# Patient Record
Sex: FEMALE | Race: ASIAN | NOT HISPANIC OR LATINO | URBAN - METROPOLITAN AREA
[De-identification: names, ages, dates, MRNs, and addresses within clinical notes are randomized per-mention and may not be internally consistent; named-entity substitution may affect disease eponyms.]

---

## 2021-01-12 ENCOUNTER — IMMUNIZATION (OUTPATIENT)
Dept: LAB | Age: 61
End: 2021-01-12

## 2021-01-12 DIAGNOSIS — Z23 NEED FOR VACCINATION: Primary | ICD-10-CM

## 2021-01-12 PROCEDURE — 0011A COVID-19 MODERNA VACCINE: CPT

## 2021-01-12 PROCEDURE — 91301 COVID-19 MODERNA VACCINE: CPT

## 2021-02-09 ENCOUNTER — IMMUNIZATION (OUTPATIENT)
Dept: LAB | Age: 61
End: 2021-02-09

## 2021-02-09 DIAGNOSIS — Z23 NEED FOR VACCINATION: Primary | ICD-10-CM

## 2021-02-09 PROCEDURE — 91301 COVID-19 MODERNA VACCINE: CPT

## 2021-02-09 PROCEDURE — 0012A COVID-19 MODERNA VACCINE: CPT

## 2021-09-24 ENCOUNTER — HOSPITAL ENCOUNTER (OUTPATIENT)
Dept: MAMMOGRAPHY | Facility: HOSPITAL | Age: 61
Discharge: HOME | End: 2021-09-24
Attending: INTERNAL MEDICINE
Payer: COMMERCIAL

## 2021-09-24 DIAGNOSIS — Z12.31 ENCOUNTER FOR SCREENING MAMMOGRAM FOR MALIGNANT NEOPLASM OF BREAST: ICD-10-CM

## 2021-09-24 DIAGNOSIS — R92.2 DENSE BREAST: ICD-10-CM

## 2021-09-24 PROCEDURE — 77067 SCR MAMMO BI INCL CAD: CPT | Performed by: INTERNAL MEDICINE

## 2021-09-24 PROCEDURE — 77063 BREAST TOMOSYNTHESIS BI: CPT | Performed by: INTERNAL MEDICINE

## 2022-06-13 ENCOUNTER — OFFICE VISIT (OUTPATIENT)
Dept: INTERNAL MEDICINE CLINIC | Facility: CLINIC | Age: 62
End: 2022-06-13
Payer: COMMERCIAL

## 2022-06-13 VITALS
WEIGHT: 136.81 LBS | HEIGHT: 60.32 IN | SYSTOLIC BLOOD PRESSURE: 120 MMHG | BODY MASS INDEX: 26.51 KG/M2 | RESPIRATION RATE: 14 BRPM | HEART RATE: 65 BPM | OXYGEN SATURATION: 99 % | DIASTOLIC BLOOD PRESSURE: 78 MMHG | TEMPERATURE: 98 F

## 2022-06-13 DIAGNOSIS — Z86.39 HISTORY OF THYROID NODULE: ICD-10-CM

## 2022-06-13 DIAGNOSIS — M85.80 OSTEOPENIA, UNSPECIFIED LOCATION: ICD-10-CM

## 2022-06-13 DIAGNOSIS — G62.9 NEUROPATHY: ICD-10-CM

## 2022-06-13 DIAGNOSIS — H11.002 PTERYGIUM EYE, LEFT: ICD-10-CM

## 2022-06-13 DIAGNOSIS — Z00.00 PHYSICAL EXAM, ANNUAL: Primary | ICD-10-CM

## 2022-06-13 DIAGNOSIS — Z12.4 CERVICAL CANCER SCREENING: ICD-10-CM

## 2022-06-13 DIAGNOSIS — Z12.11 SCREENING FOR COLON CANCER: ICD-10-CM

## 2022-06-13 DIAGNOSIS — Z12.31 ENCOUNTER FOR SCREENING MAMMOGRAM FOR BREAST CANCER: ICD-10-CM

## 2022-06-13 DIAGNOSIS — Z78.0 POSTMENOPAUSAL: ICD-10-CM

## 2022-06-13 PROBLEM — E03.9 ACQUIRED HYPOTHYROIDISM: Status: ACTIVE | Noted: 2022-06-13

## 2022-06-13 PROBLEM — M25.562 CHRONIC PAIN OF LEFT KNEE: Status: ACTIVE | Noted: 2019-04-18

## 2022-06-13 PROBLEM — M17.12 PRIMARY OSTEOARTHRITIS OF LEFT KNEE: Status: ACTIVE | Noted: 2019-04-18

## 2022-06-13 PROBLEM — G89.29 CHRONIC PAIN OF LEFT KNEE: Status: ACTIVE | Noted: 2019-04-18

## 2022-06-13 RX ORDER — IBANDRONATE SODIUM 150 MG/1
1 TABLET, FILM COATED ORAL
COMMUNITY
Start: 2015-01-01 | End: 2022-06-13

## 2022-06-13 RX ORDER — LEVOTHYROXINE SODIUM 0.07 MG/1
1 TABLET ORAL
COMMUNITY
Start: 2005-01-01

## 2022-06-13 RX ORDER — CHOLECALCIFEROL (VITAMIN D3) 25 MCG
TABLET,CHEWABLE ORAL
COMMUNITY
Start: 2016-01-01

## 2022-06-20 ENCOUNTER — TELEPHONE (OUTPATIENT)
Dept: INTERNAL MEDICINE CLINIC | Facility: CLINIC | Age: 62
End: 2022-06-20

## 2022-06-20 DIAGNOSIS — Z00.00 ANNUAL PHYSICAL EXAM: Primary | ICD-10-CM

## 2022-06-20 LAB
ALBUMIN/GLOBULIN RATIO: 1.5 (CALC) (ref 1–2.5)
ALBUMIN: 4.1 G/DL (ref 3.6–5.1)
ALKALINE PHOSPHATASE: 54 U/L (ref 37–153)
ALT: 16 U/L (ref 6–29)
AST: 19 U/L (ref 10–35)
BILIRUBIN, TOTAL: 0.6 MG/DL (ref 0.2–1.2)
BUN: 18 MG/DL (ref 7–25)
CALCIUM: 9.5 MG/DL (ref 8.6–10.4)
CARBON DIOXIDE: 28 MMOL/L (ref 20–32)
CHLORIDE: 105 MMOL/L (ref 98–110)
CHOL/HDLC RATIO: 3.3 (CALC)
CHOLESTEROL, TOTAL: 211 MG/DL
CREATININE: 0.85 MG/DL (ref 0.5–0.99)
EGFR IF AFRICN AM: 85 ML/MIN/1.73M2
EGFR IF NONAFRICN AM: 73 ML/MIN/1.73M2
GLOBULIN: 2.7 G/DL (CALC) (ref 1.9–3.7)
GLUCOSE: 90 MG/DL (ref 65–99)
HDL CHOLESTEROL: 64 MG/DL
LDL-CHOLESTEROL: 125 MG/DL (CALC)
NON-HDL CHOLESTEROL: 147 MG/DL (CALC)
POTASSIUM: 4.1 MMOL/L (ref 3.5–5.3)
PROTEIN, TOTAL: 6.8 G/DL (ref 6.1–8.1)
SODIUM: 140 MMOL/L (ref 135–146)
TRIGLYCERIDES: 116 MG/DL

## 2022-06-21 LAB
ABSOLUTE BASOPHILS: 62 CELLS/UL (ref 0–200)
ABSOLUTE EOSINOPHILS: 360 CELLS/UL (ref 15–500)
ABSOLUTE LYMPHOCYTES: 2350 CELLS/UL (ref 850–3900)
ABSOLUTE MONOCYTES: 539 CELLS/UL (ref 200–950)
ABSOLUTE NEUTROPHILS: 2889 CELLS/UL (ref 1500–7800)
ALBUMIN/GLOBULIN RATIO: 1.5 (CALC) (ref 1–2.5)
ALBUMIN: 4.1 G/DL (ref 3.6–5.1)
ALKALINE PHOSPHATASE: 54 U/L (ref 37–153)
ALT: 16 U/L (ref 6–29)
AST: 19 U/L (ref 10–35)
BASOPHILS: 1 %
BILIRUBIN, TOTAL: 0.6 MG/DL (ref 0.2–1.2)
BUN: 18 MG/DL (ref 7–25)
CALCIUM: 9.5 MG/DL (ref 8.6–10.4)
CARBON DIOXIDE: 28 MMOL/L (ref 20–32)
CHLORIDE: 105 MMOL/L (ref 98–110)
CHOL/HDLC RATIO: 3.3 (CALC)
CHOLESTEROL, TOTAL: 211 MG/DL
CREATININE: 0.85 MG/DL (ref 0.5–0.99)
EGFR IF AFRICN AM: 85 ML/MIN/1.73M2
EGFR IF NONAFRICN AM: 73 ML/MIN/1.73M2
EOSINOPHILS: 5.8 %
FOLATE, SERUM: 22.2 NG/ML
GLOBULIN: 2.7 G/DL (CALC) (ref 1.9–3.7)
GLUCOSE: 90 MG/DL (ref 65–99)
HDL CHOLESTEROL: 64 MG/DL
HEMATOCRIT: 41.8 % (ref 35–45)
HEMOGLOBIN A1C: 5.3 % OF TOTAL HGB
HEMOGLOBIN: 13.9 G/DL (ref 11.7–15.5)
LDL-CHOLESTEROL: 125 MG/DL (CALC)
LYMPHOCYTES: 37.9 %
MCH: 30.2 PG (ref 27–33)
MCHC: 33.3 G/DL (ref 32–36)
MCV: 90.7 FL (ref 80–100)
MONOCYTES: 8.7 %
MPV: 11 FL (ref 7.5–12.5)
NEUTROPHILS: 46.6 %
NON-HDL CHOLESTEROL: 147 MG/DL (CALC)
PLATELET COUNT: 275 THOUSAND/UL (ref 140–400)
POTASSIUM: 4.1 MMOL/L (ref 3.5–5.3)
PROTEIN, TOTAL: 6.8 G/DL (ref 6.1–8.1)
RDW: 12.5 % (ref 11–15)
RED BLOOD CELL COUNT: 4.61 MILLION/UL (ref 3.8–5.1)
SODIUM: 140 MMOL/L (ref 135–146)
TRIGLYCERIDES: 116 MG/DL
TSH W/REFLEX TO FT4: 1.17 MIU/L (ref 0.4–4.5)
VITAMIN B12: 1878 PG/ML (ref 200–1100)
VITAMIN D, 25-OH, TOTAL: 35 NG/ML (ref 30–100)
WHITE BLOOD CELL COUNT: 6.2 THOUSAND/UL (ref 3.8–10.8)

## 2022-06-23 LAB — HPV I/H RISK 1 DNA SPEC QL NAA+PROBE: NEGATIVE

## 2022-06-24 RX ORDER — CHOLECALCIFEROL (VITAMIN D3) 25 MCG
500 TABLET,CHEWABLE ORAL DAILY
Refills: 0 | COMMUNITY
Start: 2022-06-24

## 2022-08-01 ENCOUNTER — ORDER TRANSCRIPTION (OUTPATIENT)
Dept: ADMINISTRATIVE | Facility: HOSPITAL | Age: 62
End: 2022-08-01

## 2022-08-01 DIAGNOSIS — Z13.6 SCREENING FOR CARDIOVASCULAR CONDITION: Primary | ICD-10-CM

## 2022-08-26 ENCOUNTER — HOSPITAL ENCOUNTER (OUTPATIENT)
Dept: BONE DENSITY | Facility: HOSPITAL | Age: 62
Discharge: HOME | End: 2022-08-26
Attending: INTERNAL MEDICINE
Payer: COMMERCIAL

## 2022-08-26 DIAGNOSIS — Z78.0 POSTMENOPAUSAL: ICD-10-CM

## 2022-08-26 PROCEDURE — 77080 DXA BONE DENSITY AXIAL: CPT | Performed by: INTERNAL MEDICINE

## 2022-10-31 ENCOUNTER — HOSPITAL ENCOUNTER (OUTPATIENT)
Dept: MAMMOGRAPHY | Age: 62
Discharge: HOME | End: 2022-10-31
Attending: INTERNAL MEDICINE
Payer: COMMERCIAL

## 2022-10-31 DIAGNOSIS — Z12.31 ENCOUNTER FOR SCREENING MAMMOGRAM FOR BREAST CANCER: ICD-10-CM

## 2022-10-31 PROCEDURE — 77067 SCR MAMMO BI INCL CAD: CPT | Performed by: INTERNAL MEDICINE

## 2022-10-31 PROCEDURE — 77063 BREAST TOMOSYNTHESIS BI: CPT | Performed by: INTERNAL MEDICINE

## 2023-01-04 ENCOUNTER — MOBILE ENCOUNTER (OUTPATIENT)
Dept: INTERNAL MEDICINE CLINIC | Facility: CLINIC | Age: 63
End: 2023-01-04

## 2023-04-20 ENCOUNTER — TELEPHONE (OUTPATIENT)
Dept: INTERNAL MEDICINE CLINIC | Facility: CLINIC | Age: 63
End: 2023-04-20

## 2023-04-20 NOTE — TELEPHONE ENCOUNTER
Future Appointments   Date Time Provider Rhett Salazar   5/9/2023  8:00 AM Yomi Aldana MD [facility]     Pt made appt for abdominal pain.  Needed to triage the pt     Left message to call back

## 2023-04-20 NOTE — TELEPHONE ENCOUNTER
Talked to pt and has intermittent abdominal pain by the navel, bilateral  sides. Pt had it for more than 10 days. Pain is sometimes sharp and 3/10 on pain scale. Denies N/V, appetite  issues, constipation, fever. Pain is not radiating. Advised if symptoms worsen to go to [facility], otherwise to keep appt as is. Patient notified.  Patient verbalized understanding

## 2023-09-05 ENCOUNTER — TELEPHONE (OUTPATIENT)
Dept: INTERNAL MEDICINE CLINIC | Facility: CLINIC | Age: 63
End: 2023-09-05

## 2023-09-05 DIAGNOSIS — Z00.00 LABORATORY EXAMINATION ORDERED AS PART OF A ROUTINE GENERAL MEDICAL EXAMINATION: ICD-10-CM

## 2023-09-05 DIAGNOSIS — Z12.31 ENCOUNTER FOR SCREENING MAMMOGRAM FOR BREAST CANCER: Primary | ICD-10-CM

## 2023-09-05 NOTE — TELEPHONE ENCOUNTER
Hi Dr. Katherine Terrazas, patient has appointment 10/19/23 for annual wellness and asking for fasting lab orders and mammogram order to be placed. She is aware due for labs now, mammogram due after 10/31/23. Orders pended, you did iron levels last year as well but I just pended basics.

## 2023-09-06 NOTE — TELEPHONE ENCOUNTER
Noted     Future Appointments   Date Time Provider Rhett Salazar   10/19/2023  7:20 AM Violet Leon MD [facility]

## 2023-10-08 LAB
ABSOLUTE BASOPHILS: 53 CELLS/UL (ref 0–200)
ABSOLUTE EOSINOPHILS: 283 CELLS/UL (ref 15–500)
ABSOLUTE LYMPHOCYTES: 1658 CELLS/UL (ref 850–3900)
ABSOLUTE MONOCYTES: 543 CELLS/UL (ref 200–950)
ABSOLUTE NEUTROPHILS: 3363 CELLS/UL (ref 1500–7800)
ALBUMIN/GLOBULIN RATIO: 1.3 (CALC) (ref 1–2.5)
ALBUMIN: 4.3 G/DL (ref 3.6–5.1)
ALKALINE PHOSPHATASE: 76 U/L (ref 37–153)
ALT: 17 U/L (ref 6–29)
AST: 18 U/L (ref 10–35)
BASOPHILS: 0.9 %
BILIRUBIN, TOTAL: 0.6 MG/DL (ref 0.2–1.2)
BUN: 16 MG/DL (ref 7–25)
CALCIUM: 9.7 MG/DL (ref 8.6–10.4)
CARBON DIOXIDE: 27 MMOL/L (ref 20–32)
CHLORIDE: 105 MMOL/L (ref 98–110)
CHOL/HDLC RATIO: 3.8 (CALC)
CHOLESTEROL, TOTAL: 227 MG/DL
CREATININE: 0.76 MG/DL (ref 0.5–1.05)
EGFR: 87 ML/MIN/1.73M2
EOSINOPHILS: 4.8 %
GLOBULIN: 3.3 G/DL (CALC) (ref 1.9–3.7)
GLUCOSE: 105 MG/DL (ref 65–99)
HDL CHOLESTEROL: 60 MG/DL
HEMATOCRIT: 43.5 % (ref 35–45)
HEMOGLOBIN: 14.7 G/DL (ref 11.7–15.5)
LDL-CHOLESTEROL: 142 MG/DL (CALC)
LYMPHOCYTES: 28.1 %
MCH: 30.3 PG (ref 27–33)
MCHC: 33.8 G/DL (ref 32–36)
MCV: 89.7 FL (ref 80–100)
MONOCYTES: 9.2 %
MPV: 11 FL (ref 7.5–12.5)
NEUTROPHILS: 57 %
NON-HDL CHOLESTEROL: 167 MG/DL (CALC)
PLATELET COUNT: 330 THOUSAND/UL (ref 140–400)
POTASSIUM: 4.1 MMOL/L (ref 3.5–5.3)
PROTEIN, TOTAL: 7.6 G/DL (ref 6.1–8.1)
RDW: 12.3 % (ref 11–15)
RED BLOOD CELL COUNT: 4.85 MILLION/UL (ref 3.8–5.1)
SODIUM: 140 MMOL/L (ref 135–146)
TRIGLYCERIDES: 129 MG/DL
TSH W/REFLEX TO FT4: 1.49 MIU/L (ref 0.4–4.5)
WHITE BLOOD CELL COUNT: 5.9 THOUSAND/UL (ref 3.8–10.8)

## 2023-10-17 ENCOUNTER — HOSPITAL ENCOUNTER (OUTPATIENT)
Dept: CT IMAGING | Age: 63
Discharge: HOME | End: 2023-10-17
Attending: INTERNAL MEDICINE

## 2023-10-17 DIAGNOSIS — Z13.6 SCREENING FOR HEART DISEASE: ICD-10-CM

## 2023-10-19 ENCOUNTER — OFFICE VISIT (OUTPATIENT)
Dept: INTERNAL MEDICINE CLINIC | Facility: CLINIC | Age: 63
End: 2023-10-19
Payer: MEDICARE

## 2023-10-19 ENCOUNTER — HOSPITAL ENCOUNTER (OUTPATIENT)
Dept: GENERAL RADIOLOGY | Age: 63
Discharge: HOME | End: 2023-10-19
Attending: INTERNAL MEDICINE
Payer: MEDICARE

## 2023-10-19 VITALS
OXYGEN SATURATION: 99 % | HEIGHT: 60.5 IN | TEMPERATURE: 98 F | SYSTOLIC BLOOD PRESSURE: 118 MMHG | WEIGHT: 135.88 LBS | BODY MASS INDEX: 25.99 KG/M2 | RESPIRATION RATE: 16 BRPM | DIASTOLIC BLOOD PRESSURE: 72 MMHG | HEART RATE: 65 BPM

## 2023-10-19 DIAGNOSIS — Z86.39 HISTORY OF THYROID NODULE: ICD-10-CM

## 2023-10-19 DIAGNOSIS — G89.29 CHRONIC PAIN OF LEFT KNEE: ICD-10-CM

## 2023-10-19 DIAGNOSIS — Z00.00 ENCOUNTER FOR ANNUAL HEALTH EXAMINATION: ICD-10-CM

## 2023-10-19 DIAGNOSIS — E78.5 HYPERLIPIDEMIA, UNSPECIFIED HYPERLIPIDEMIA TYPE: ICD-10-CM

## 2023-10-19 DIAGNOSIS — Z00.00 WELCOME TO MEDICARE PREVENTIVE VISIT: Primary | ICD-10-CM

## 2023-10-19 DIAGNOSIS — R93.89 ABNORMAL CHEST CT: ICD-10-CM

## 2023-10-19 DIAGNOSIS — Z12.11 SCREENING FOR COLON CANCER: ICD-10-CM

## 2023-10-19 DIAGNOSIS — E03.9 ACQUIRED HYPOTHYROIDISM: ICD-10-CM

## 2023-10-19 DIAGNOSIS — M85.80 OSTEOPENIA, UNSPECIFIED LOCATION: ICD-10-CM

## 2023-10-19 DIAGNOSIS — M25.562 CHRONIC PAIN OF LEFT KNEE: ICD-10-CM

## 2023-10-19 DIAGNOSIS — Z11.59 NEED FOR HEPATITIS C SCREENING TEST: ICD-10-CM

## 2023-10-19 DIAGNOSIS — R10.32 LLQ PAIN: ICD-10-CM

## 2023-10-19 DIAGNOSIS — Z23 NEED FOR VACCINATION: ICD-10-CM

## 2023-10-19 LAB
ATRIAL RATE: 64 BPM
P AXIS: 60 DEGREES
P-R INTERVAL: 160 MS
Q-T INTERVAL: 394 MS
QRS DURATION: 74 MS
QTC CALCULATION (BEZET): 406 MS
R AXIS: 33 DEGREES
T AXIS: 28 DEGREES
VENTRICULAR RATE: 64 BPM

## 2023-10-19 PROCEDURE — 90677 PCV20 VACCINE IM: CPT | Performed by: INTERNAL MEDICINE

## 2023-10-19 PROCEDURE — 99214 OFFICE O/P EST MOD 30 MIN: CPT | Performed by: INTERNAL MEDICINE

## 2023-10-19 PROCEDURE — 73562 X-RAY EXAM OF KNEE 3: CPT | Performed by: INTERNAL MEDICINE

## 2023-10-19 PROCEDURE — G0009 ADMIN PNEUMOCOCCAL VACCINE: HCPCS | Performed by: INTERNAL MEDICINE

## 2023-10-19 PROCEDURE — G0403 EKG FOR INITIAL PREVENT EXAM: HCPCS | Performed by: INTERNAL MEDICINE

## 2023-10-19 PROCEDURE — G0402 INITIAL PREVENTIVE EXAM: HCPCS | Performed by: INTERNAL MEDICINE

## 2023-10-19 RX ORDER — ROSUVASTATIN CALCIUM 10 MG/1
10 TABLET, COATED ORAL NIGHTLY
COMMUNITY

## 2023-11-01 ENCOUNTER — HOSPITAL ENCOUNTER (OUTPATIENT)
Dept: MAMMOGRAPHY | Age: 63
Discharge: HOME | End: 2023-11-01
Attending: INTERNAL MEDICINE
Payer: MEDICARE

## 2023-11-01 DIAGNOSIS — Z12.31 ENCOUNTER FOR SCREENING MAMMOGRAM FOR BREAST CANCER: ICD-10-CM

## 2023-11-01 PROCEDURE — 77067 SCR MAMMO BI INCL CAD: CPT | Performed by: INTERNAL MEDICINE

## 2023-11-01 PROCEDURE — 77063 BREAST TOMOSYNTHESIS BI: CPT | Performed by: INTERNAL MEDICINE

## 2023-11-02 ENCOUNTER — HOSPITAL ENCOUNTER (OUTPATIENT)
Dept: ULTRASOUND IMAGING | Age: 63
Discharge: HOME | End: 2023-11-02
Attending: INTERNAL MEDICINE
Payer: MEDICARE

## 2023-11-02 DIAGNOSIS — Z86.39 HISTORY OF THYROID NODULE: ICD-10-CM

## 2023-11-02 PROCEDURE — 76536 US EXAM OF HEAD AND NECK: CPT | Performed by: INTERNAL MEDICINE

## 2023-11-11 ENCOUNTER — HOSPITAL ENCOUNTER (OUTPATIENT)
Dept: CT IMAGING | Facility: HOSPITAL | Age: 63
Discharge: HOME | End: 2023-11-11
Attending: INTERNAL MEDICINE
Payer: MEDICARE

## 2023-11-11 DIAGNOSIS — R10.32 LLQ PAIN: ICD-10-CM

## 2023-11-11 LAB
CREAT BLD-MCNC: 0.7 MG/DL
EGFRCR SERPLBLD CKD-EPI 2021: 96 ML/MIN/1.73M2 (ref 60–?)

## 2023-11-11 PROCEDURE — 74177 CT ABD & PELVIS W/CONTRAST: CPT | Performed by: INTERNAL MEDICINE

## 2023-11-11 PROCEDURE — 82565 ASSAY OF CREATININE: CPT

## 2023-11-13 ENCOUNTER — TELEPHONE (OUTPATIENT)
Dept: GASTROENTEROLOGY | Facility: CLINIC | Age: 63
End: 2023-11-13

## 2023-11-13 NOTE — TELEPHONE ENCOUNTER
Can we please get patient in to see me this week or in 2 weeks   Ok to double book on my MD approval slots

## 2023-11-13 NOTE — TELEPHONE ENCOUNTER
RN called and spoke to pt. Pt scheduled for clinic appt on 11/15/23. Date, time, and location verified with pt. Pt verbalized understanding. Pt states her insurance is not BCBS, has Medicare and [employer]. Instructed pt to bring insurance card to appointment so correct information can be entered into system.      Your Appointments        Wednesday November 15, 2023  1:00 PM  Consult with Theresa Monique MD  [address], [facility],[address], [place] ([place] [id number]) [address] [place] [address]  [phone]

## 2023-11-14 ENCOUNTER — OFFICE VISIT (OUTPATIENT)
Dept: PHYSICAL THERAPY | Age: 63
End: 2023-11-14
Attending: INTERNAL MEDICINE
Payer: MEDICARE

## 2023-11-14 DIAGNOSIS — G89.29 CHRONIC PAIN OF LEFT KNEE: Primary | ICD-10-CM

## 2023-11-14 DIAGNOSIS — M25.562 CHRONIC PAIN OF LEFT KNEE: Primary | ICD-10-CM

## 2023-11-14 PROCEDURE — 97140 MANUAL THERAPY 1/> REGIONS: CPT

## 2023-11-14 PROCEDURE — 97110 THERAPEUTIC EXERCISES: CPT

## 2023-11-14 PROCEDURE — 97161 PT EVAL LOW COMPLEX 20 MIN: CPT

## 2023-11-15 ENCOUNTER — OFFICE VISIT (OUTPATIENT)
Dept: GASTROENTEROLOGY | Facility: CLINIC | Age: 63
End: 2023-11-15
Payer: MEDICARE

## 2023-11-15 ENCOUNTER — TELEPHONE (OUTPATIENT)
Dept: GASTROENTEROLOGY | Facility: CLINIC | Age: 63
End: 2023-11-15

## 2023-11-15 VITALS
WEIGHT: 136 LBS | SYSTOLIC BLOOD PRESSURE: 124 MMHG | BODY MASS INDEX: 26.7 KG/M2 | HEIGHT: 60 IN | DIASTOLIC BLOOD PRESSURE: 70 MMHG

## 2023-11-15 DIAGNOSIS — R93.3 ABNORMAL CT SCAN, COLON: Primary | ICD-10-CM

## 2023-11-15 DIAGNOSIS — R93.3 ABNORMAL CT SCAN, COLON: ICD-10-CM

## 2023-11-15 DIAGNOSIS — K57.92 DIVERTICULITIS: Primary | ICD-10-CM

## 2023-11-15 PROCEDURE — 99204 OFFICE O/P NEW MOD 45 MIN: CPT | Performed by: INTERNAL MEDICINE

## 2023-11-15 RX ORDER — CIPROFLOXACIN 500 MG/1
500 TABLET, FILM COATED ORAL 2 TIMES DAILY
Qty: 30 TABLET | Refills: 0 | Status: SHIPPED | OUTPATIENT
Start: 2023-11-15

## 2023-11-15 RX ORDER — SODIUM, POTASSIUM,MAG SULFATES 17.5-3.13G
SOLUTION, RECONSTITUTED, ORAL ORAL
Qty: 1 EACH | Refills: 0 | Status: SHIPPED | OUTPATIENT
Start: 2023-11-15

## 2023-11-15 RX ORDER — METRONIDAZOLE 250 MG/1
250 TABLET ORAL 4 TIMES DAILY
Qty: 56 TABLET | Refills: 0 | Status: SHIPPED | OUTPATIENT
Start: 2023-11-15 | End: 2023-11-29

## 2023-11-15 NOTE — PATIENT INSTRUCTIONS
1. Diverticulitis     2.  Abnormal CT scan, colon        Recommend:  Discussed possible diverticulitis and can treat as such  Plan colonoscopy in 8 weeks after completing abx and allow healing  -Schedule colonoscopy w/MAC for abnormal CT scan  -Prep: suprep or trilyte or equivalent     ** If MAC @ [facility]:                 - NO alcohol, recreational drugs nor erectile dysfunction mediations 24 hours before procedure(s)                - NO herbal supplements or weight loss medications x 7 days prior to the procedure(s)     ** If MAC @ [facility] or IV twilight - continue all medications as prescribed

## 2023-11-15 NOTE — TELEPHONE ENCOUNTER
Scheduled for:  Colonoscopy 25970/40233  Provider Name:  Dr. Anita Finley  Date:  2/27/2024  Location:  [facility]  Sedation:  MAC  Time:  8:00 am, (pt is aware that [facility] will call the day before to confirm arrival time)  Prep:  Suprep  Meds/Allergies Reconciled?:  Physician reviewed  Diagnosis with codes:  Abnormal CT scan, colon R93.3  Was patient informed to call insurance with codes (Y/N): Yes    Referral sent?:  Referral was sent at the time of electronic surgical scheduling. [facility] or [address]  notified?:  I sent an electronic request to Endo Scheduling and received a confirmation today. Medication Orders:  N/A  Misc Orders:  N/A     Further instructions given by staff:  Prep instructions were given to pt in the office, pt verbalized understanding.

## 2023-11-16 ENCOUNTER — OFFICE VISIT (OUTPATIENT)
Dept: PHYSICAL THERAPY | Age: 63
End: 2023-11-16
Attending: INTERNAL MEDICINE
Payer: MEDICARE

## 2023-11-16 PROCEDURE — 97110 THERAPEUTIC EXERCISES: CPT

## 2023-11-16 PROCEDURE — 97140 MANUAL THERAPY 1/> REGIONS: CPT

## 2023-11-16 NOTE — PROGRESS NOTES
Diagnosis:   L knee pain   Referring Provider: Brenda Fleming  Date of Evaluation:    11/14/2023    Precautions:  None Next MD visit:   none scheduled  Date of Surgery: n/a     Insurance Primary/Secondary: MEDICARE / [employer]     # Auth Visits: 10          Subjective: Pt was not too sore after the evaluation. Pain: 4/10  Objective:  Stiffness in med hamstring    Assessment: Progressed WB strengthening today with the knee in extension. Plan to assess latent symptoms after today's session and progress as appropriate. Plan: next visit: consider step up  Goals:    (10 visits)  Pt will have no greater than 1/10 pain to ease prolonged standing and amb. Pt will have WNL knee ROM to ease transfers and ambulation. Pt will have at least -20 hamstring length and improved ITB flexibility to ease standing and transfers. Pt will have 5/5 knee and hip strength to ease amb and transfers. Pt will be able to negotiate a flight of stairs for functional mobility.     Date: 11/14/2023  Tx#:   1 11/16/2023  Tx#: 2   STM - ITB, distal quad, med ham  Patellar mob, lat lift, med/lat, Gr III STM - ITB, distal quad, med ham  Patellar mob, lat lift, med/lat, Gr III   Ham stretch Ham stretch   Quad set, x20  SLR, x10  Bridge, x10  Clam, B supine, x20, red SLR, x20  Clam, R/L, x20, red  Bridge, x10  SB ham curl, x20    Shuttle - leg press  DL: x20, 4c  SL: x20, 3c    Rockerboard, AP, ML tap, x20  Side stepping, x3, 10 ft, red  TKE cable, x20, 15#           HEP: (11/14/2023) clam, bridge, SLR, quad set  Charges: 1 Man(12 min), 2 TherEx (30 min)  Total Timed Treatment: 42 min  Total Treatment Time: 42 min

## 2023-11-21 ENCOUNTER — OFFICE VISIT (OUTPATIENT)
Dept: PHYSICAL THERAPY | Age: 63
End: 2023-11-21
Attending: INTERNAL MEDICINE
Payer: MEDICARE

## 2023-11-21 PROCEDURE — 97140 MANUAL THERAPY 1/> REGIONS: CPT

## 2023-11-21 PROCEDURE — 97110 THERAPEUTIC EXERCISES: CPT

## 2023-11-21 NOTE — PROGRESS NOTES
Diagnosis:   L knee pain   Referring Provider: Brenda Fleming  Date of Evaluation:    11/14/2023    Precautions:  None Next MD visit:   none scheduled  Date of Surgery: n/a     Insurance Primary/Secondary: MEDICARE / [employer]ad[employer]     # Auth Visits: 10          Subjective: Pt was not sore after the last visit. Pain: 4/10  Objective: lateral patellar tracking with knee flexion    Assessment: Progressed WB strengthening with knee flexion today. Pt noted some glut soreness with strengthening today but denied knee pain. Plan: next visit: update HEP, rosalie Doss  Goals:    (10 visits)  Pt will have no greater than 1/10 pain to ease prolonged standing and amb. Pt will have WNL knee ROM to ease transfers and ambulation. Pt will have at least -20 hamstring length and improved ITB flexibility to ease standing and transfers. Pt will have 5/5 knee and hip strength to ease amb and transfers. Pt will be able to negotiate a flight of stairs for functional mobility.     Date: 11/14/2023  Tx#:   1 11/16/2023  Tx#: 2 11/21/2023  Tx#: 3   STM - ITB, distal quad, med ham  Patellar mob, lat lift, med/lat, Gr III STM - ITB, distal quad, med ham  Patellar mob, lat lift, med/lat, Gr III STM - ITB, distal quad, med ham  Patellar mob, lat lift, med/lat, Gr III   Ham stretch Ham stretch Ham stretch  Prone quad stretch   Quad set, x20  SLR, x10  Bridge, x10  Clam, B supine, x20, red SLR, x20  Clam, R/L, x20, red  Bridge, x10  SB ham curl, x20 SLR, x20  Clam, R/L, x20, red  SB Bridge, x10  SB ham curl, x20  Prone, ham curl, x10    Shuttle - leg press  DL: x20, 4c  SL: x20, 3c     Rockerboard, AP, ML tap, x20  Side stepping, x3, 10 ft, red  TKE cable, x20, 15# Step up, lat, 4 inch, x15  Side stepping, x4, 10 ft, red  Hip ext, R/L, x20, red     Rockerboard, AP, ML tap, x20        HEP: (11/14/2023) clam, bridge, SLR, quad set  Charges: 1 Man(10 min), 2 TherEx (30 min)  Total Timed Treatment: 40 min  Total Treatment Time: 40 min

## 2023-11-27 LAB
ALBUMIN/GLOBULIN RATIO: 1.2 (CALC) (ref 1–2.5)
ALBUMIN: 4.1 G/DL (ref 3.6–5.1)
ALKALINE PHOSPHATASE: 63 U/L (ref 37–153)
ALT: 33 U/L (ref 6–29)
AST: 46 U/L (ref 10–35)
BILIRUBIN, DIRECT: 0.1 MG/DL
BILIRUBIN, INDIRECT: 0.3 MG/DL (CALC) (ref 0.2–1.2)
BILIRUBIN, TOTAL: 0.4 MG/DL (ref 0.2–1.2)
GLOBULIN: 3.3 G/DL (CALC) (ref 1.9–3.7)
PROTEIN, TOTAL: 7.4 G/DL (ref 6.1–8.1)

## 2023-11-28 ENCOUNTER — OFFICE VISIT (OUTPATIENT)
Dept: PHYSICAL THERAPY | Age: 63
End: 2023-11-28
Attending: INTERNAL MEDICINE
Payer: MEDICARE

## 2023-11-28 PROCEDURE — 97140 MANUAL THERAPY 1/> REGIONS: CPT

## 2023-11-28 PROCEDURE — 97110 THERAPEUTIC EXERCISES: CPT

## 2023-11-28 NOTE — PROGRESS NOTES
Diagnosis:   L knee pain   Referring Provider: Vinicius Freeman  Date of Evaluation:    11/14/2023    Precautions:  None Next MD visit:   none scheduled  Date of Surgery: n/a     Insurance Primary/Secondary: MEDICARE / [employer] Bo [employer]     # Auth Visits: 10          Subjective:  Pt was a little sore in the hips after our last visit. This subsided after 2-3 days. Pain: 4/10  Objective:   WNL ham flexibility    Assessment: Updated HEP today for progression into closed chain strengthening. Discussed POC, and pt feels she will be able to continue with HEP at home to continue toward her goals. If no issues arise, plan to d/c next visit. Plan: next visit: consider d/c next   Goals:    (10 visits)  Pt will have no greater than 1/10 pain to ease prolonged standing and amb. Pt will have WNL knee ROM to ease transfers and ambulation. Pt will have at least -20 hamstring length and improved ITB flexibility to ease standing  and transfers. Pt will have 5/5 knee and hip strength to ease amb and transfers. Pt will be able to negotiate a flight of stairs for functional mobility.     Date: 11/14/2023  Tx#:   1 11/16/2023  Tx#: 2 11/21/2023  Tx#: 3 11/28/2023  Tx#: 4   STM - ITB, distal quad, med ham  Patellar mob, lat lift, med/lat, Gr III STM - ITB, distal quad, med ham  Patellar mob, lat lift, med/lat, Gr III STM - ITB, distal quad, med ham  Patellar mob, lat lift, med/lat, Gr III STM - ITB, distal quad, med ham  Patellar mob, lat lift, med/lat, Gr III   Ham stretch Ham stretch Ham stretch  Prone quad stretch Ham stretch  NuStep,    Quad set, x20  SLR, x10  Bridge, x10  Clam, B supine, x20, red SLR, x20  Clam, R/L, x20, red  Bridge, x10  SB ham curl, x20 SLR, x20  Clam, R/L, x20, red  SB Bridge, x10  SB ham curl, x20   Prone, ham curl, x10 SLR, x20, 1#  SB Bridge, x10  SB ham curl, x20   Clam, R/L, x20, green    Shuttle - leg press  DL: x20, 4c  SL: x20, 3c  Shuttle - leg press  DL: x20, 5c  SL: x20, 3c    Rockerboard, AP, ML tap, x20  Side stepping, x3, 10 ft, red  TKE cable, x20, 15# Step up, lat, 4 inch, x15  Side stepping, x4, 10 ft, red  Hip ext, R/L, x20, red Side stepping, x3, 10 ft, red  Step up, lat, 6 inch, x10  Hip ext, R/L, x20, red     Rockerboard, AP, ML tap, x20 Rockerboard, AP, ML tap, x20  Heel raise, airex, x20      BOSU lunge, x10, R/L  Zig zag, x3, 10 ft, red   HEP: (11/14/2023) clam, bridge, SLR, quad set (11/28/2023) side stepping, hip ext, step up lat  W q1  Charges: 1 Man(10 min), 2 TherEx (30 min)  Total Timed Treatment: 40 min  Total Treatment Time: 40 min

## 2023-11-30 ENCOUNTER — OFFICE VISIT (OUTPATIENT)
Dept: PHYSICAL THERAPY | Age: 63
End: 2023-11-30
Attending: INTERNAL MEDICINE
Payer: MEDICARE

## 2023-11-30 PROCEDURE — 97110 THERAPEUTIC EXERCISES: CPT

## 2023-11-30 PROCEDURE — 97140 MANUAL THERAPY 1/> REGIONS: CPT

## 2023-11-30 NOTE — PROGRESS NOTES
Diagnosis:   L knee pain   Referring Provider: Donis Dempsey  Date of Evaluation:    11/14/2023    Precautions:  None Next MD visit:   none scheduled  Date of Surgery: n/a     Insurance Primary/Secondary: MEDICARE / [employer] Crigler [employer]     # Auth Visits: 10           Discharge Summary  Pt has attended 5 visits in Physical Therapy. Subjective:  Pt notes overall her knee is feeling better. She intermittently has symptoms with stairs and with cold weather. Pain: 1-2/10  Objective:   Palpation: TTP at lat/med patellar facets  Sensation: intact to gross touch. Pt denies LE numbness and tingling. A/PROM Strength    R L R L   Hip WNL WNL       Flexion   4+ 4+     Extension   4+ 4+     Abduction   4+ 4+   Knee WNL WNL       Flexion   5 5     Extension   5 5   Foot / Ankle WNL WNL     * indicates pain  Flexibility:  Hip Flexor: R 10, L 10  Hamstrings: R -15; L -15    Assessment: Pt has been treated in PT for L knee pain. Pt currently presents with improved flexibility and strength. Discussed POC and as pt  has improved symptoms and has met her PT goals, we are in agreement to d/c with HEP at this time. Goals:    MET  (10 visits)  Pt will have no greater than 1/10 pain to ease prolonged standing and amb. Pt will have WNL knee ROM to ease transfers and ambulation. Pt will have at least -20 hamstring length and improved ITB flexibility to ease standing  and transfers. Pt will have 5/5 knee and hip strength to ease amb and transfers. Pt will be able to negotiate a flight of stairs for functional mobility. LEFS Score  LEFS Score: 86.25 % (11/14/2023  3:30 PM)    Post LEFS Score  Post LEFS Score: 86.25 % (11/30/2023  3:37 PM)    0 % improvement    Plan: d/c with HEP    Patient/Family/Caregiver was advised of these findings, precautions, and treatment options and has agreed to actively participate in planning and for this course of care.     Thank you for your referral. If you have any questions, please contact me at Dept: [phone].     Sincerely,  Electronically signed by therapist: Loni Dominguez PT     Date: 11/14/2023  Tx#:   1 11/16/2023  Tx#: 2 11/21/2023  Tx#: 3 11/28/2023  Tx#: 4 11/30/2023  Tx#: 5   STM - ITB, distal quad, med ham  Patellar mob, lat lift, med/lat, Gr III STM - ITB, distal quad, med ham  Patellar mob, lat lift, med/lat, Gr III STM - ITB, distal quad, med ham  Patellar mob, lat lift, med/lat, Gr III STM - ITB, distal quad, med ham  Patellar mob, lat lift, med/lat, Gr III STM - ITB, distal quad, med ham  Patellar mob, lat lift, med/lat, Gr III   Ham stretch Ham stretch Ham stretch  Prone quad stretch Ham stretch  NuStep,  NuStep, 5 min, 3 load  Ham stretch   Quad set, x20  SLR, x10  Bridge, x10  Clam, B supine, x20, red SLR, x20  Clam, R/L, x20, red  Bridge, x10  SB ham curl, x20 SLR, x20  Clam, R/L, x20, red  SB Bridge, x10  SB ham curl, x20   Prone, ham curl, x10 SLR, x20, 1#  SB Bridge, x10  SB ham curl, x20   Clam, R/L, x20, green SLR, x20, 1#  SB Bridge, x10  SB ham curl, x20     Shuttle - leg press  DL: x20, 4c  SL: x20, 3c  Shuttle - leg press  DL: x20, 5c  SL: x20, 3c Shuttle - leg press  DL: x20, 4c  SL: x20, 3c    Rockerboard, AP, ML tap, x20  Side stepping, x3, 10 ft, red  TKE cable, x20, 15# Step up, lat, 4 inch, x15  Side stepping, x4, 10 ft, red  Hip ext, R/L, x20, red Side stepping, x3, 10 ft, red  Step up, lat, 6 inch, x10  Hip ext, R/L, x20, red Side stepping, x3, 10 ft, red  Zig zag, x2, 10 ft, red  Step up, lat, 4 inch, x15     Rockerboard, AP, ML tap, x20 Rockerboard, AP, ML tap, x20  Heel raise, airex, x20 Rockerboard, AP, ML tap, x20  Heel raise, airex, x20      BOSU lunge, x10, R/L  Zig zag, x3, 10 ft, red BOSU lunge, x10, R/L     HEP: (11/14/2023) clam, bridge, SLR, quad set (11/28/2023) side stepping, hip ext, step up lat    Charges: 1 Man(10 min), 2 TherEx (30 min)  Total Timed Treatment: 40 min  Total Treatment Time: 40 min

## 2023-12-06 ENCOUNTER — TELEPHONE (OUTPATIENT)
Facility: CLINIC | Age: 63
End: 2023-12-06

## 2023-12-12 ENCOUNTER — TELEPHONE (OUTPATIENT)
Facility: CLINIC | Age: 63
End: 2023-12-12

## 2023-12-12 NOTE — TELEPHONE ENCOUNTER
Pt called back, name/ verified. Informed pt that Sandhya Qureshi already spoke with her  and she confirmed it. She denies pain at present. Discussed message from Sandhya Qureshi below. Patient verbalized understanding, no further concerns, issues at this time.

## 2023-12-12 NOTE — TELEPHONE ENCOUNTER
I called and left a detailed voicemail informing him that Dr Gallardo Onel has no cancellations or earlier dates available than her scheduled date:    Scheduled for colonoscopy on 2/27/24 @ [address] @ 8 am    Closing TE

## 2023-12-12 NOTE — TELEPHONE ENCOUNTER
Doyle Villegas,   Pt's  called; he states he is a retired GI physician. He states pt is having continued LLQ pain. She is napping currently; he gave her tylenol and advil for the discomfort but she usually doesn't like taking meds. .     states she is having bms, denies bleeding or fevers. Eating and drinking ok. CT from 11/11/23 showed narrowing and thickening of sigmoid colon. Saw Johnny Elliott in clinic 11/15 and was ordered abx for possible diverticulitis. Colon scheduled for 2/27/24.  very worried about possible cancer and wants to have colon done sooner. Discussed need to wait at least 8 weeks r/t possible diverticulitis and  does not feel she actually had that. Discussed that Dr. Johnny Elliott is out of office currently but that I would notify you. I think it may be best if you call  with recommendations but please let me know of the plan.    Thanks,  Candance Pipes

## 2023-12-12 NOTE — TELEPHONE ENCOUNTER
Apn returned 's call. Pain improved after 3-4 days of antibiotics, but then returned. Has had mild, gen tenderness. No change in bm, brbpr, and/or melena. No n/v. No fever/chills. Pain mild at present and improved with supportive treatment. Pt has fhx lymphoma    They are concerned about cancer and are asking for scope earlier than what is scheduled. We discussed risks/benefits of procedure and increased risk for complication during c-scope if she has active diverticulitis. I advised liqquid/low residue diet x next 24-48h and advance diet as tolerated  repeat cbc, ct a/p if on-going/worsening symptoms    They will continue w/ supportive treatment, modified diet and contact office/consider ed if on-going worsening symptoms.

## 2023-12-13 NOTE — TELEPHONE ENCOUNTER
I called and left a detailed voicemail message for patient that there is a cancellation on 1/02/2024 @ same location, [facility]. I asked patient to call before end of day today.      hold placed on date 1/02/2023 @ [facility]coran @ 9 am.

## 2023-12-13 NOTE — TELEPHONE ENCOUNTER
Patient returning call and is ready for the 1/2/2024 and would like a call back for the time. Please call at [phone],SIRI.

## 2024-01-02 PROCEDURE — 88305 TISSUE EXAM BY PATHOLOGIST: CPT | Performed by: INTERNAL MEDICINE

## 2024-01-05 ENCOUNTER — TELEPHONE (OUTPATIENT)
Facility: CLINIC | Age: 64
End: 2024-01-05

## 2024-01-05 NOTE — TELEPHONE ENCOUNTER
Health maintenance updated.    Last colonoscopy done 1/2/24 by Dr. Chakraborty    10 year recall placed into Pt Outreach    Next due on 1/2/2034  per Dr. Chakraborty

## 2024-01-05 NOTE — TELEPHONE ENCOUNTER
----- Message from Delmi Chakraborty MD sent at 1/5/2024 11:36 AM CST -----  GI staff: please place recall in for colonoscopy in 10 years

## 2024-01-11 ENCOUNTER — MED REC SCAN ONLY (OUTPATIENT)
Facility: CLINIC | Age: 64
End: 2024-01-11

## 2024-03-04 RX ORDER — ROSUVASTATIN CALCIUM 10 MG/1
10 TABLET, COATED ORAL NIGHTLY
Qty: 90 TABLET | Refills: 0 | Status: SHIPPED | OUTPATIENT
Start: 2024-03-04

## 2024-03-04 NOTE — TELEPHONE ENCOUNTER
Is this medication prescribed by the [facility]? No but Pt stated that Amelie is aware that she is taking this med. Had extra at home due to  being on it before     Did the patient contact the pharmacy directly?:  no new Rx     Is patient out of meds or supply very low?:  out     Medication Requested:  rosuvastatin (CRESTOR) 10 MG Oral Tab     Dose:      Is patient requesting a 30 or 90 day supply?:  90     Pharmacy name and phone # or location:  [address] [phone], [phone]     Is the patient due for an appointment?: no   (if so, please schedule appt)    Additional Notes:      Please advise the patient refills take up to 72 business hours.

## 2024-03-04 NOTE — TELEPHONE ENCOUNTER
I did a 90 day refill. Last LFTs were mildly elevated and she was to recheck in 2/2024. Please remind her to do these.

## 2024-03-04 NOTE — TELEPHONE ENCOUNTER
Last OV relevant to medication: 10/19/23  Last refill date: historical-see note below  When pt was asked to return for OV: 10/19/24  Upcoming appt/reason: No future appointments.  Was pt informed of any over due labs: utd  Lab Results   Component Value Date    CHOLEST 227 (H) 10/07/2023    TRIG 129 10/07/2023    HDL 60 10/07/2023     (H) 10/07/2023    TCHDLRATIO 3.8 10/07/2023    NONHDLC 167 (H) 10/07/2023

## 2024-05-02 ENCOUNTER — TELEPHONE (OUTPATIENT)
Dept: INTERNAL MEDICINE CLINIC | Facility: CLINIC | Age: 64
End: 2024-05-02

## 2024-05-02 NOTE — TELEPHONE ENCOUNTER
Pt stated that she got a bill for her labs for her AWV. Pt got the labs done before AWV but wanted to get the labs to be coded as AWV labs. AWV was on 10/19/24 and labs were drawn on 10/7/24  Please let Pt know when the labs are resubmitted.   Thank you

## 2024-05-02 NOTE — TELEPHONE ENCOUNTER
See message below. Labs were all ordered under \"laboratory examination ordered as part of a general medical examination\". Nothing to change, correct? Thanks!

## 2024-05-03 NOTE — TELEPHONE ENCOUNTER
Medicare only covers certain screening tests.   Please resubmit as follows:   Tsh, lipid, cmp under hypothyroid.   Cbc under knee pain, llq pain, neuropathy.   Thanks.

## 2024-05-22 ENCOUNTER — TELEPHONE (OUTPATIENT)
Dept: INTERNAL MEDICINE CLINIC | Facility: CLINIC | Age: 64
End: 2024-05-22

## 2024-05-22 NOTE — TELEPHONE ENCOUNTER
Incoming (mail or fax):  Fax  Received from:  [facility]  Documentation given to:  Triage

## 2024-05-22 NOTE — TELEPHONE ENCOUNTER
The labs were done on 10/7/23 in [employer] [facility]. Usually [employer] sends a form that we sign to change the codes for labs. Left message to call back for Danna  from [facility] [phone]. Form in Triage

## 2024-05-31 RX ORDER — ROSUVASTATIN CALCIUM 10 MG/1
10 TABLET, COATED ORAL NIGHTLY
Qty: 90 TABLET | Refills: 0 | Status: SHIPPED | OUTPATIENT
Start: 2024-05-31

## 2024-05-31 NOTE — TELEPHONE ENCOUNTER
Last OV relevant to medication: 10/19/23   Last refill date: 3/4/24     #/refills: 90/0   When pt was asked to return for OV: Return in about 1 year (around 10/19/2024) for annual wellness visit, med check.    Upcoming appt/reason: No future appointments.    Was pt informed of any over due labs: overdue, another mcm sent    Patient was previously notified regarding labs:  \"TIFFANY Jose,   The medication was refilled for you. But you do need to do lab in quest to check the liver function. No fasting is needed for this lab. Please go to [facility] or [facility] and have it done. Thanks     Last read by Rebeca Frankel at  4:23 PM on 3/14/2024.\"     Lab Results   Component Value Date    CHOLEST 227 (H) 10/07/2023    TRIG 129 10/07/2023    HDL 60 10/07/2023     (H) 10/07/2023    TCHDLRATIO 3.8 10/07/2023    NONHDLC 167 (H) 10/07/2023

## 2024-09-09 RX ORDER — ROSUVASTATIN CALCIUM 10 MG/1
10 TABLET, COATED ORAL NIGHTLY
Qty: 90 TABLET | Refills: 0 | Status: SHIPPED | OUTPATIENT
Start: 2024-09-09

## 2024-10-06 NOTE — PROGRESS NOTES
Subjective:   Rebeca Frankel is a 64 year old female who presents for a Initial Annual Wellness Visit (outside the first 12 months of Medicare eligibility, no prior AWV) and med check.     Mammo done 11/2023. Ordered.   Dexa done 8/2022 showed osteopenia. Ordered.   Pap done 4/2019. She declines paps. Pelvic and breast exam done 10/2023.   Colonoscopy done 1/2024, no polyps. Repeat in 10 years. Done by dr. Chakraborty. 1 polyp. Not adenoma.       Up to date shingrix, prevnar 20, rsv, tdap.   Get covid booster at her local pharmacy.  Declines flu shot today.      AHA flowsheet reviewed.   No falls.   Memory testing normal.   Mood has been stable. No depression.   Seeing eye doctor yearly.       Hypothyroid: on levothyroxine. No fatigue or palpitations.      Osteopenia: was on ibandronate for 10 years. Stopped 2022 for drug holiday.     Hyperlipidemia: on statin. No muscle aches.        She had a heart scan. Agatston score of 30. Already on statin. Discussed today.     Overread shows calcified pleural plaques bilaterally. No other masses. She lived in [facility] and had asbestos exposure. She has no shortness of breath. No wheezing. Referred to pulm but has not seen them. She did not remember she had to do this.      She did PT for the left knee. Doing okay.      Saw dr. Chakraborty for abdominal pain and abnormal ct abd pelvis. Pain has resolved.     History/Other:   Fall Risk Assessment:   She has been screened for Falls and is low risk.      Cognitive Assessment:   She had a completely normal cognitive assessment - see flowsheet entries       Functional Ability/Status:   Rebeca Frankel has a completely normal functional assessment. See flowsheet for details.      Depression Screening (PHQ):  PHQ-2 SCORE: 0  , done 10/7/2024   Last [place] Suicide Screening on 10/7/2024 was No Risk.       Advanced Directives:   She does have a Living Will but we do NOT have it on file in Epic.    She does NOT have a Power of   for Health Care. [Do you have a healthcare power of ?: No]  Discussed with patient and provided information.        Patient Active Problem List   Diagnosis    Primary osteoarthritis of left knee    Chronic pain of left knee    History of thyroid nodule    Neuropathy    Osteopenia    Pterygium eye, left    Acquired hypothyroidism     Allergies:  She has No Known Allergies.    Current Medications:  Outpatient Medications Marked as Taking for the 10/7/24 encounter (Office Visit) with Nidia Lan MD   Medication Sig    ROSUVASTATIN 10 MG Oral Tab TAKE 1 TABLET BY MOUTH EVERY DAY AT NIGHT    Multiple Vitamins-Minerals (CENTRUM) Oral Tab Take 1 tablet by mouth daily.    levothyroxine 75 MCG Oral Tab Take 1 tablet (75 mcg total) by mouth every morning before breakfast.    Calcium Citrate-Vitamin D (CITRACAL + D OR) Take 1 tablet by mouth daily.    CALCIUM OR Take 1 tablet by mouth daily.       Medical History:  She  has a past medical history of Arthritis (), Hypothyroidism (), Osteoarthritis (), and Thyroid nodule.  Surgical History:  She  has a past surgical history that includes colonoscopy (); other surgical history;  (); colonoscopy; and colonoscopy (N/A, 2024).   Family History:  Her family history includes Cancer in her father; Other in her mother.  Social History:  She  reports that she has never smoked. She has never used smokeless tobacco. She reports that she does not drink alcohol and does not use drugs.    Tobacco:  She has never smoked tobacco.    CAGE Alcohol Screen:   CAGE screening score of 0 on 10/7/2024, showing low risk of alcohol abuse.      Patient Care Team:  Nidia Lan MD as PCP - General (Internal Medicine)  Jaida Oconnor PT as Physical Therapist (Physical Therapy)    Review of Systems  See hpi    Objective:   Physical Exam  General appearance: alert, appears stated age, and cooperative  Lungs: clear to auscultation bilaterally  Heart: S1, S2 normal,  no murmur, click, rub or gallop, regular rate and rhythm  Abdomen: soft, non-tender; bowel sounds normal; no masses,  no organomegaly  Extremities:  no edema, muscle strength normal.   Breast: symmetric, no masses, no tenderness, no nipple discharge.     /64 (BP Location: Right arm, Patient Position: Sitting, Cuff Size: adult)   Pulse 78   Temp 97 °F (36.1 °C) (Temporal)   Resp 16   Ht 5' 0.25\" (1.53 m)   Wt 131 lb 12.8 oz (59.8 kg)   Breastfeeding No   BMI 25.53 kg/m²  Estimated body mass index is 25.53 kg/m² as calculated from the following:    Height as of this encounter: 5' 0.25\" (1.53 m).    Weight as of this encounter: 131 lb 12.8 oz (59.8 kg).    Medicare Hearing Assessment:   Hearing Screening    Screening Method: Finger Rub  Finger Rub Result: Pass               Assessment & Plan:   Rebeca Frankel is a 64 year old female who presents for a Medicare Assessment.     1. Encounter for annual health examination  Mammo done 11/2023. Ordered.   Dexa done 8/2022 showed osteopenia. Ordered.   Pap done 4/2019. She declines paps. Pelvic and breast exam done 10/2023.   Colonoscopy done 1/2024, no polyps. Repeat in 10 years. Done by dr. Chakraborty. 1 polyp. Not adenoma.       Up to date shingrix, prevnar 20, rsv, tdap.   Get covid booster at her local pharmacy.  Declines flu shot today.      AHA flowsheet reviewed.   No falls.   Memory testing normal.   Mood has been stable. No depression.   Seeing eye doctor yearly.      2. Acquired hypothyroidism  Continue levothyroxine.   - Assay, Thyroid Stim Hormone    3. Osteopenia, unspecified location  Continue calcium and vit d intake.   Dexa ordered.     4. Hyperlipidemia, unspecified hyperlipidemia type  Continue statin.   Do labs.   - Comp Metabolic Panel (14)  - Lipid Panel    5. IFG (impaired fasting glucose)  Low carb diet.   - Hemoglobin A1C [E]    6. Abnormal chest CT  Urged to see pulm.   She has no symptoms.   - Pulmonary Referral - In Network    7.  Agatston CAC score, <100  Continue statin.     8. Encounter for screening mammogram for malignant neoplasm of breast  - [facility] ANDI 2D+3D SCREENING BILAT (CPT=77067/01171); Future    9. Postmenopausal  - XR DEXA BONE DENSITOMETRY (CPT=77080); Future      The patient indicates understanding of these issues and agrees to the plan.  Reinforced healthy diet, lifestyle, and exercise.      Return in about 1 year (around 10/7/2025) for annual wellness visit, med check.     Nidia Lan MD, 10/6/2024     Supplementary Documentation:   General Health:  In the past six months, have you lost more than 10 pounds without trying?: 2 - No  Has your appetite been poor?: No  Type of Diet: Balanced;Vegetarian  How does the patient maintain a good energy level?: Daily Walks  How would you describe your daily physical activity?: Moderate  How would you describe your current health state?: Good  How do you maintain positive mental well-being?: Games;Visiting Friends;Visiting Family  On a scale of 0 to 10, with 0 being no pain and 10 being severe pain, what is your pain level?: 4 - (Moderate) (left knee pain)  In the past six months, have you experienced urine leakage?: 0-No  At any time do you feel concerned for the safety/well-being of yourself and/or your children, in your home or elsewhere?: No  Have you had any immunizations at another office such as Influenza, Hepatitis B, Tetanus, or Pneumococcal?: No    Health Maintenance   Topic Date Due    Annual Depression Screening  01/01/2024    Fall Risk Screening (Annual)  01/01/2024    COVID-19 Vaccine (7 - 2023-24 season) 09/01/2024    Influenza Vaccine (1) 10/01/2024    Annual Physical  10/19/2024    Mammogram  11/01/2024    Colorectal Cancer Screening  01/02/2034    DEXA Scan  Completed    Pneumococcal Vaccine: 65+ Years  Completed    Zoster Vaccines  Completed

## 2024-10-07 ENCOUNTER — OFFICE VISIT (OUTPATIENT)
Dept: INTERNAL MEDICINE CLINIC | Facility: CLINIC | Age: 64
End: 2024-10-07
Payer: MEDICARE

## 2024-10-07 VITALS
WEIGHT: 131.81 LBS | SYSTOLIC BLOOD PRESSURE: 104 MMHG | TEMPERATURE: 97 F | HEART RATE: 78 BPM | HEIGHT: 60.25 IN | RESPIRATION RATE: 16 BRPM | DIASTOLIC BLOOD PRESSURE: 64 MMHG | BODY MASS INDEX: 25.54 KG/M2

## 2024-10-07 DIAGNOSIS — R73.01 IFG (IMPAIRED FASTING GLUCOSE): ICD-10-CM

## 2024-10-07 DIAGNOSIS — R93.1 AGATSTON CAC SCORE, <100: ICD-10-CM

## 2024-10-07 DIAGNOSIS — E03.9 ACQUIRED HYPOTHYROIDISM: ICD-10-CM

## 2024-10-07 DIAGNOSIS — Z00.00 ENCOUNTER FOR ANNUAL HEALTH EXAMINATION: Primary | ICD-10-CM

## 2024-10-07 DIAGNOSIS — M85.80 OSTEOPENIA, UNSPECIFIED LOCATION: ICD-10-CM

## 2024-10-07 DIAGNOSIS — E78.5 HYPERLIPIDEMIA, UNSPECIFIED HYPERLIPIDEMIA TYPE: ICD-10-CM

## 2024-10-07 DIAGNOSIS — Z78.0 POSTMENOPAUSAL: ICD-10-CM

## 2024-10-07 DIAGNOSIS — Z12.31 ENCOUNTER FOR SCREENING MAMMOGRAM FOR MALIGNANT NEOPLASM OF BREAST: ICD-10-CM

## 2024-10-07 DIAGNOSIS — R93.89 ABNORMAL CHEST CT: ICD-10-CM

## 2024-10-23 LAB
ALBUMIN/GLOBULIN RATIO: 1.4 (CALC) (ref 1–2.5)
ALBUMIN: 4.3 G/DL (ref 3.6–5.1)
ALKALINE PHOSPHATASE: 69 U/L (ref 37–153)
ALT: 19 U/L (ref 6–29)
AST: 20 U/L (ref 10–35)
BILIRUBIN, TOTAL: 0.6 MG/DL (ref 0.2–1.2)
BUN: 15 MG/DL (ref 7–25)
CALCIUM: 9.7 MG/DL (ref 8.6–10.4)
CARBON DIOXIDE: 28 MMOL/L (ref 20–32)
CHLORIDE: 106 MMOL/L (ref 98–110)
CHOL/HDLC RATIO: 2 (CALC)
CHOLESTEROL, TOTAL: 148 MG/DL
CREATININE: 0.83 MG/DL (ref 0.5–1.05)
EGFR: 78 ML/MIN/1.73M2
GLOBULIN: 3 G/DL (CALC) (ref 1.9–3.7)
GLUCOSE: 96 MG/DL (ref 65–99)
HDL CHOLESTEROL: 75 MG/DL
HEMOGLOBIN A1C: 5.7 % OF TOTAL HGB
LDL-CHOLESTEROL: 58 MG/DL (CALC)
NON-HDL CHOLESTEROL: 73 MG/DL (CALC)
POTASSIUM: 3.7 MMOL/L (ref 3.5–5.3)
PROTEIN, TOTAL: 7.3 G/DL (ref 6.1–8.1)
SODIUM: 141 MMOL/L (ref 135–146)
TRIGLYCERIDES: 69 MG/DL
TSH: 1.48 MIU/L (ref 0.4–4.5)

## 2024-11-11 ENCOUNTER — HOSPITAL ENCOUNTER (OUTPATIENT)
Dept: MAMMOGRAPHY | Age: 64
Discharge: HOME | End: 2024-11-11
Attending: INTERNAL MEDICINE
Payer: MEDICARE

## 2024-11-11 ENCOUNTER — HOSPITAL ENCOUNTER (OUTPATIENT)
Dept: BONE DENSITY | Age: 64
Discharge: HOME | End: 2024-11-11
Attending: INTERNAL MEDICINE
Payer: MEDICARE

## 2024-11-11 DIAGNOSIS — Z12.31 ENCOUNTER FOR SCREENING MAMMOGRAM FOR MALIGNANT NEOPLASM OF BREAST: ICD-10-CM

## 2024-11-11 DIAGNOSIS — Z78.0 POSTMENOPAUSAL: ICD-10-CM

## 2024-11-11 PROCEDURE — 77063 BREAST TOMOSYNTHESIS BI: CPT | Performed by: INTERNAL MEDICINE

## 2024-11-11 PROCEDURE — 77080 DXA BONE DENSITY AXIAL: CPT | Performed by: INTERNAL MEDICINE

## 2024-11-11 PROCEDURE — 77067 SCR MAMMO BI INCL CAD: CPT | Performed by: INTERNAL MEDICINE

## 2024-12-12 RX ORDER — ROSUVASTATIN CALCIUM 10 MG/1
10 TABLET, COATED ORAL NIGHTLY
Qty: 90 TABLET | Refills: 1 | Status: SHIPPED | OUTPATIENT
Start: 2024-12-12

## 2024-12-12 NOTE — TELEPHONE ENCOUNTER
Cholesterol Medication Protocol [id number]/11/2024 01:04 PM   Protocol Details ALT < 80    ALT resulted within past year    Lipid panel within past 12 months    In person appointment or virtual visit in the past 12 mos or appointment in next 3 mos

## 2025-06-02 RX ORDER — ROSUVASTATIN CALCIUM 10 MG/1
10 TABLET, COATED ORAL NIGHTLY
Qty: 90 TABLET | Refills: 1 | Status: SHIPPED | OUTPATIENT
Start: 2025-06-02

## 2025-06-02 NOTE — TELEPHONE ENCOUNTER
Cholesterol Medication Protocol [id number]/31/2025 12:32 PM   Protocol Details ALT < 80    ALT resulted within past year    Lipid panel within past 12 months    In person appointment or virtual visit in the past 12 mos or appointment in next 3 mos    Medication is active on med list      4. Hyperlipidemia, unspecified hyperlipidemia type  Continue statin.   Do labs.   No future appointments.